# Patient Record
Sex: MALE | Race: OTHER | NOT HISPANIC OR LATINO
[De-identification: names, ages, dates, MRNs, and addresses within clinical notes are randomized per-mention and may not be internally consistent; named-entity substitution may affect disease eponyms.]

---

## 2017-12-22 ENCOUNTER — APPOINTMENT (OUTPATIENT)
Dept: MRI IMAGING | Facility: IMAGING CENTER | Age: 32
End: 2017-12-22
Payer: COMMERCIAL

## 2017-12-22 ENCOUNTER — OUTPATIENT (OUTPATIENT)
Dept: OUTPATIENT SERVICES | Facility: HOSPITAL | Age: 32
LOS: 1 days | End: 2017-12-22
Payer: COMMERCIAL

## 2017-12-22 DIAGNOSIS — E29.1 TESTICULAR HYPOFUNCTION: ICD-10-CM

## 2017-12-22 PROCEDURE — 70553 MRI BRAIN STEM W/O & W/DYE: CPT

## 2017-12-22 PROCEDURE — A9585: CPT

## 2017-12-22 PROCEDURE — 70553 MRI BRAIN STEM W/O & W/DYE: CPT | Mod: 26

## 2020-12-07 ENCOUNTER — TRANSCRIPTION ENCOUNTER (OUTPATIENT)
Age: 35
End: 2020-12-07

## 2023-04-17 ENCOUNTER — NON-APPOINTMENT (OUTPATIENT)
Age: 38
End: 2023-04-17

## 2024-11-22 ENCOUNTER — EMERGENCY (EMERGENCY)
Facility: HOSPITAL | Age: 39
LOS: 1 days | Discharge: HOME | End: 2024-11-22
Admitting: PERSONAL EMERGENCY RESPONSE ATTENDANT
Payer: OTHER MISCELLANEOUS

## 2024-11-22 VITALS
RESPIRATION RATE: 20 BRPM | TEMPERATURE: 98 F | WEIGHT: 210.1 LBS | OXYGEN SATURATION: 100 % | SYSTOLIC BLOOD PRESSURE: 135 MMHG | DIASTOLIC BLOOD PRESSURE: 93 MMHG | HEART RATE: 61 BPM

## 2024-11-22 PROCEDURE — 99284 EMERGENCY DEPT VISIT MOD MDM: CPT

## 2024-11-22 PROCEDURE — 72100 X-RAY EXAM L-S SPINE 2/3 VWS: CPT | Mod: 26

## 2024-11-22 RX ORDER — CYCLOBENZAPRINE HYDROCHLORIDE 30 MG/1
1 CAPSULE, EXTENDED RELEASE ORAL
Qty: 21 | Refills: 0
Start: 2024-11-22 | End: 2024-11-28

## 2024-11-22 RX ORDER — ACETAMINOPHEN 500 MG
975 TABLET ORAL ONCE
Refills: 0 | Status: COMPLETED | OUTPATIENT
Start: 2024-11-22 | End: 2024-11-22

## 2024-11-22 RX ORDER — NAPROXEN 250 MG/1
1 TABLET ORAL
Qty: 14 | Refills: 0
Start: 2024-11-22 | End: 2024-11-28

## 2024-11-22 RX ORDER — METHYLPREDNISOLONE ACETATE 80 MG/ML
1 INJECTION, SUSPENSION INTRALESIONAL; INTRAMUSCULAR; INTRASYNOVIAL; SOFT TISSUE
Qty: 21 | Refills: 0
Start: 2024-11-22 | End: 2024-11-27

## 2024-11-22 RX ORDER — KETOROLAC TROMETHAMINE 30 MG/ML
30 INJECTION INTRAMUSCULAR; INTRAVENOUS ONCE
Refills: 0 | Status: DISCONTINUED | OUTPATIENT
Start: 2024-11-22 | End: 2024-11-22

## 2024-11-22 RX ORDER — CYCLOBENZAPRINE HYDROCHLORIDE 30 MG/1
10 CAPSULE, EXTENDED RELEASE ORAL ONCE
Refills: 0 | Status: COMPLETED | OUTPATIENT
Start: 2024-11-22 | End: 2024-11-22

## 2024-11-22 RX ORDER — LIDOCAINE HYDROCHLORIDE 40 MG/ML
1 SOLUTION TOPICAL ONCE
Refills: 0 | Status: COMPLETED | OUTPATIENT
Start: 2024-11-22 | End: 2024-11-22

## 2024-11-22 RX ADMIN — KETOROLAC TROMETHAMINE 30 MILLIGRAM(S): 30 INJECTION INTRAMUSCULAR; INTRAVENOUS at 23:06

## 2024-11-22 RX ADMIN — LIDOCAINE HYDROCHLORIDE 1 PATCH: 40 SOLUTION TOPICAL at 23:06

## 2024-11-22 RX ADMIN — CYCLOBENZAPRINE HYDROCHLORIDE 10 MILLIGRAM(S): 30 CAPSULE, EXTENDED RELEASE ORAL at 23:06

## 2024-11-22 RX ADMIN — Medication 975 MILLIGRAM(S): at 23:06

## 2024-11-22 NOTE — ED PROVIDER NOTE - PATIENT PORTAL LINK FT
You can access the FollowMyHealth Patient Portal offered by [facility] by registering at the following website: [email or web address]. By joining [employer]’s FollowMyHealth portal, you will also be able to view your health information using other applications (apps) compatible with our system.

## 2024-11-22 NOTE — ED PROVIDER NOTE - OBJECTIVE STATEMENT
40 y/o male w/ no pmh reports today c/o r-lbp X today. he did not try anything for his sx and moving in certain directions makes his pain worse. .dc 38 y/o male w/ no pmh reports today c/o r-lbp X today. he did not try anything for his sx and moving in certain directions makes his pain worse. He states sx began when he fell at work- works for the [employer]. Denies; bowel/bladder dysfunction, saddle parasthesia, urinary retention, cp, sob, abdominal pain, dizziness,ha, syncopal episode, fever, chills, nausea or vomiting.

## 2024-11-22 NOTE — ED PROVIDER NOTE - CLINICAL SUMMARY MEDICAL DECISION MAKING FREE TEXT BOX
40 y/o male w/ no pmh reports today c/o r-lbp X today. he did not try anything for his sx and moving in certain directions makes his pain worse. He states sx began when he fell at work- works for the [employer].    plan for pt- xr, pain meds, ua   pt likely will be dc   he is resting comfortablly- will reassess.

## 2024-11-22 NOTE — ED PROVIDER NOTE - PROGRESS NOTE DETAILS
pt feels better  xr- no fx.  cyclobenzaprine and medrol dose pack prescribed  Reccomended pt f/u w/ pcp/ortho  apap/motrin reccomended  ED return precautions discussed-prn or if sx worsen.  He was able to ambulate on his own out of the ED w/o assistance. pt feels better  xr- no fx.  cyclobenzaprine,naproxen and medrol dose pack prescribed  Reccomended pt f/u w/ pcp/ortho  apap  reccomended prn pain  ED return precautions discussed-prn or if sx worsen.  He was able to ambulate on his own out of the ED w/o assistance.